# Patient Record
Sex: MALE | Race: WHITE
[De-identification: names, ages, dates, MRNs, and addresses within clinical notes are randomized per-mention and may not be internally consistent; named-entity substitution may affect disease eponyms.]

---

## 2018-05-03 ENCOUNTER — HOSPITAL ENCOUNTER (EMERGENCY)
Dept: HOSPITAL 68 - ERH | Age: 63
LOS: 1 days | End: 2018-05-04
Payer: COMMERCIAL

## 2018-05-03 DIAGNOSIS — F17.210: ICD-10-CM

## 2018-05-03 DIAGNOSIS — J02.9: Primary | ICD-10-CM

## 2018-05-03 NOTE — ED DYSPNEA/ASTHMA COMPLAINT
History of Present Illness
 
General
Chief Complaint: Dyspnea (COPD, CHF, Other)
Stated Complaint: "IM HAVING A PANIC ATTACKDIFF BREATHING"
Source: patient
Exam Limitations: no limitations
 
Vital Signs & Intake/Output
Vital Signs & Intake/Output
 Vital Signs
 
 
Date Time Temp Pulse Resp B/P B/P Pulse O2 O2 Flow FiO2
 
     Mean Ox Delivery Rate 
 
05/03 2314 97.4 77 19 148/83  97 Room Air  
 
 
 ED Intake and Output
 
 
 05/04 0000 05/03 1200
 
Intake Total  
 
Output Total  
 
Balance  
 
   
 
Patient 170 lb 
 
Weight  
 
Weight Reported by Patient 
 
Measurement  
 
Method  
 
 
 
Allergies
Coded Allergies:
No Known Allergies (05/03/18)
 
Reconcile Medications
Azithromycin (Zithromax) 500 MG TABLET   1 TAB PO DAILY BRONCHITIS
Benzonatate (Tessalon Perle) 100 MG CAPSULE   1 CAP PO TID PRN INFLAMMATION
[MAGIC MOUTH WASH] 10 ML PO TID PRN SORE THROAT
Methylprednisolone. (Medrol) 4 MG TAB.DS.PK   1 DP PO AD INFLAMMATION
     6 on day 1 then reduce by one tablet daily until gone
 
Triage Note:
PT TO ED WITH C/O SORE THROAT X 3 DAYS, REPORTS
 DIFFICULTY SWALLOWING. ABLE TO HANDLE SECRETIONS.
 SPEAKING IN FULL SENTENCES. 02 SAT 97%RA. ALSO
 REPORTS MILD SOB ON EXERTION.
Triage Nurses Notes Reviewed? yes
Onset: Gradual
Duration: constant
Timing: recent history
Severity: moderate
HPI:
Patient is a 62-year-old male with a past medical history of COPD who is 
EVERYDAY smoker compliant with Ventolin and ANORA, diabetes who presents 
emergency room with concerns of a three-day history of sore throat and 
hoarseness of voice work today he has been complaining of nonproductive cough 
and painful swallowing where he became anxious due to shortness of breath 
symptoms earlier this afternoon.  Patient states that the anxiety shortness of 
breath has improved however still complaining of sore throat.  Patient can 
tolerate by mouth denies any chest pain and arm pain jaw pain nausea vomiting 
leg swelling or hemoptysis fever or chills.
(Jeremy Glover)
 
Past History
 
Travel History
Traveled to [place] past 21 day No
 
Medical History
Any Pertinent Medical History? see below for history
Cardiovascular: hypertension
Respiratory: COPD
Endocrine: diabetes
 
Surgical History
Surgical History: non-contributory
 
Psychosocial History
What is your primary language English
Tobacco Use: Current Daily Use
Daily Tobacco Use Amount/Type: => 5 Cigarettes daily
ETOH Use: denies use
 
Family History
Hx Contributory? No
(Jeremy Glover)
 
Review of Systems
 
Review of Systems
Constitutional:
Reports: no symptoms. 
EENTM:
Reports: see HPI. 
Respiratory:
Reports: see HPI, cough, short of breath. 
Cardiovascular:
Reports: no symptoms. 
GI:
Reports: no symptoms. 
Genitourinary:
Reports: no symptoms. 
Musculoskeletal:
Reports: no symptoms. 
Skin:
Reports: no symptoms. 
Neurological/Psychological:
Reports: see HPI, anxiety. 
Hematologic/Endocrine:
Reports: no symptoms. 
Immunologic/Allergic:
Reports: no symptoms. 
All Other Systems: Reviewed and Negative
(Jeremy Glover)
 
Physical Exam
 
Physical Exam
General Appearance: no apparent distress, alert, comfortable
Head: atraumatic
Eyes:
Bilateral: normal appearance. 
Ears, Nose, Throat: hearing grossly normal
Neck: normal inspection
Respiratory: normal breath sounds, chest non-tender, no respiratory distress
Cardiovascular: regular rate/rhythm
Gastrointestinal: normal bowel sounds, soft, non-tender
Neurologic/Psych: no motor/sensory deficits
Skin: intact, normal color, warm/dry
 
Core Measures
ACS in differential dx? Yes
CVA/TIA Diagnosis No
Sepsis Present: No
Sepsis Focused Exam Completed? No
(Jeremy Glover)
 
Progress
Differential Diagnosis: asthma, AMI, bronchitis, costochondritis, CHF, COPD, 
musculoskeletal pain, pericarditis, pulmonary embolism, pneumonia, pneumothorax,
rib fracture, unstable angina
Plan of Care:
 Orders
 
 
Procedure Date/time Status
 
THROAT CULTURE W/QUICK STREP 05/04 0009 Active
 
TROPONIN LEVEL 05/04 0009 Complete
 
D-DIMER 05/04 0009 Complete
 
COMPREHENSIVE METABOLIC PANEL 05/04 0009 Complete
 
CBC WITHOUT DIFFERENTIAL 05/04 0009 Complete
 
EKG 05/04 0009 Active
 
 
 Laboratory Tests
 
 
05/04/18 0030:
Anion Gap 14, Estimated GFR > 60, BUN/Creatinine Ratio 13.8, Glucose 100  H, 
Calcium 9.5, Total Bilirubin 0.6, AST 24, ALT 31, Alkaline Phosphatase 69, 
Troponin I < 0.01, Total Protein 8.0, Albumin 4.7, Globulin 3.3, Albumin/
Globulin Ratio 1.4, D-Dimer High Sensitivty < 200, CBC w Diff NO MAN DIFF REQ, 
RBC 5.35, MCV 89.1, MCH 30.1, MCHC 33.7, RDW 15.1  H, MPV 7.3  L, Gran % 79.3  H
, Lymphocytes % 13.1  L, Monocytes % 4.9, Eosinophils % 1.9, Basophils % 0.8, 
Absolute Granulocytes 11.5  H, Absolute Lymphocytes 1.9, Absolute Monocytes 0.7 
H, Absolute Eosinophils 0.3, Absolute Basophils 0.1
Patient upon initial examination was resting comfortably at bedside (afebrile 
clear lungs auscultation oxygen saturation 96% room air.
No exudates a pharynx CENTOR criteria 0
Discuss hand off with dr smith
 
Initial ED EKG: normal intervals, normal p-waves, normal QRS complex, 65 BPM,NSR
Hand-Off
   Endorsed To:
Du Smith MD
   Endorsed Time: 0100
   Pending: labs
(Jeremy Glover)
 
Departure
 
Departure
Disposition: HOME OR SELF CARE
Condition: Stable
Clinical Impression
Primary Impression: Pharyngitis
Referrals:
Osiris Bryan MD (PCP/Family)
 
Additional Instructions:
As discussed please discontinue smoking, began a prescription for magic 
mouthwash for sore throat and Tessalon Perles for cough  Medrol Dosepak for 
inflammation and azithromycin for symptoms.  If symptoms worsen return to 
emergency room.  If no better on Monday follow-up with your doctor
Departure Forms:
Customer Survey
General Discharge Information
Prescriptions:
Current Visit Scripts
[MAGIC MOUTH WASH] 10 ML PO TID PRN SORE THROAT 
     #100 ML 
 
Methylprednisolone. (Medrol) 1 DP PO AD  
     #1 DP 
     6 on day 1 then reduce by one tablet daily until gone
 
Benzonatate (Tessalon Perle) 1 CAP PO TID PRN INFLAMMATION 
     #15 CAP 
 
Azithromycin (Zithromax) 1 TAB PO DAILY  
     #5 TAB 
 
 
(Jeremy Glover)
 
Departure
Time of Disposition: 0147
 
PA/NP Co-Sign Statement
Statement:
ED Attending supervision documentation-
 
x I saw and evaluated the patient. I have also reviewed all the pertinent lab 
results and diagnostic results. I agree with the findings and the plan of care 
as documented in the PA's/NP's documentation. 
 
[] I have reviewed the ED Record and agree with the PA's/NP's documentation.
 
[] Additions or exceptions (if any) to the PAs/NP's note and plan are 
summarized below:
[]
 
(Du Smith MD)
 
Critical Care Note
 
Critical Care Note
Critical Care Time: non-applicable
(Jeremy Glover)

## 2018-05-04 VITALS — SYSTOLIC BLOOD PRESSURE: 145 MMHG | DIASTOLIC BLOOD PRESSURE: 78 MMHG

## 2018-05-04 LAB
ABSOLUTE GRANULOCYTE CT: 11.5 /CUMM (ref 1.4–6.5)
BASOPHILS # BLD: 0.1 /CUMM (ref 0–0.2)
BASOPHILS NFR BLD: 0.8 % (ref 0–2)
EOSINOPHIL # BLD: 0.3 /CUMM (ref 0–0.7)
EOSINOPHIL NFR BLD: 1.9 % (ref 0–5)
ERYTHROCYTE [DISTWIDTH] IN BLOOD BY AUTOMATED COUNT: 15.1 % (ref 11.5–14.5)
GRANULOCYTES NFR BLD: 79.3 % (ref 42.2–75.2)
HCT VFR BLD CALC: 47.7 % (ref 42–52)
LYMPHOCYTES # BLD: 1.9 /CUMM (ref 1.2–3.4)
MCH RBC QN AUTO: 30.1 PG (ref 27–31)
MCHC RBC AUTO-ENTMCNC: 33.7 G/DL (ref 33–37)
MCV RBC AUTO: 89.1 FL (ref 80–94)
MONOCYTES # BLD: 0.7 /CUMM (ref 0.1–0.6)
PLATELET # BLD: 268 /CUMM (ref 130–400)
PMV BLD AUTO: 7.3 FL (ref 7.4–10.4)
RED BLOOD CELL CT: 5.35 /CUMM (ref 4.7–6.1)
WBC # BLD AUTO: 14.5 /CUMM (ref 4.8–10.8)

## 2018-08-28 ENCOUNTER — HOSPITAL ENCOUNTER (EMERGENCY)
Dept: HOSPITAL 68 - ERH | Age: 63
End: 2018-08-28
Payer: COMMERCIAL

## 2018-08-28 VITALS — DIASTOLIC BLOOD PRESSURE: 80 MMHG | SYSTOLIC BLOOD PRESSURE: 140 MMHG

## 2018-08-28 DIAGNOSIS — F17.210: ICD-10-CM

## 2018-08-28 DIAGNOSIS — E11.9: ICD-10-CM

## 2018-08-28 DIAGNOSIS — F41.9: ICD-10-CM

## 2018-08-28 DIAGNOSIS — F10.10: ICD-10-CM

## 2018-08-28 DIAGNOSIS — J44.1: Primary | ICD-10-CM

## 2018-08-28 DIAGNOSIS — I10: ICD-10-CM

## 2018-08-28 LAB
ABSOLUTE GRANULOCYTE CT: 11 /CUMM (ref 1.4–6.5)
BASOPHILS # BLD: 0 /CUMM (ref 0–0.2)
BASOPHILS NFR BLD: 0.1 % (ref 0–2)
EOSINOPHIL # BLD: 0.4 /CUMM (ref 0–0.7)
EOSINOPHIL NFR BLD: 2.8 % (ref 0–5)
ERYTHROCYTE [DISTWIDTH] IN BLOOD BY AUTOMATED COUNT: 14.7 % (ref 11.5–14.5)
GRANULOCYTES NFR BLD: 86 % (ref 42.2–75.2)
HCT VFR BLD CALC: 47.3 % (ref 42–52)
LYMPHOCYTES # BLD: 0.9 /CUMM (ref 1.2–3.4)
MCH RBC QN AUTO: 30.1 PG (ref 27–31)
MCHC RBC AUTO-ENTMCNC: 33.9 G/DL (ref 33–37)
MCV RBC AUTO: 88.8 FL (ref 80–94)
MONOCYTES # BLD: 0.5 /CUMM (ref 0.1–0.6)
PLATELET # BLD: 243 /CUMM (ref 130–400)
PMV BLD AUTO: 7.2 FL (ref 7.4–10.4)
RED BLOOD CELL CT: 5.33 /CUMM (ref 4.7–6.1)
WBC # BLD AUTO: 12.8 /CUMM (ref 4.8–10.8)

## 2018-08-28 NOTE — RADIOLOGY REPORT
EXAMINATION:
XR CHEST
 
CLINICAL INFORMATION:
Shortness of breath
 
COMPARISON:
None
 
TECHNIQUE:
2 views of the chest were obtained.
 
FINDINGS:
The lungs appear somewhat hyperinflated, suggesting underlying COPD. No focal
consolidation. No evidence of pneumothorax, pulmonary edema, or pleural
effusions.
 
The cardiomediastinal silhouette is unremarkable. No acute osseous findings.
 
IMPRESSION:
No acute cardiopulmonary findings.

## 2018-08-28 NOTE — ED DYSPNEA/ASTHMA COMPLAINT
History of Present Illness
 
General
Chief Complaint: Dyspnea (COPD, CHF, Other)
Stated Complaint: "I CAN'T BREATH, PANCIK ATTACK"HR 75 AND SPO2 96%
Source: patient, old records
Exam Limitations: no limitations
 
Vital Signs & Intake/Output
Vital Signs & Intake/Output
 Vital Signs
 
 
Date Time Temp Pulse Resp B/P B/P Pulse O2 O2 Flow FiO2
 
     Mean Ox Delivery Rate 
 
08/28 0513 97.7 64 20 145/78  94 Room Air  
 
 
 
Allergies
Coded Allergies:
No Known Allergies (05/03/18)
 
Reconcile Medications
Azithromycin (Zithromax) 250 MG TABLET   1 DP PO AD pharyngitis
     Start tomorrow
Benzonatate (Tessalon Perle) 100 MG CAPSULE   1 CAP PO TID PRN cough
Hydroxyzine Hydrochloride (Atarax) 25 MG TAB   1-2 TAB PO Q6P PRN anxiety
Ibuprofen 600 MG TABLET   1 TAB PO Q6P PRN pain
     with food
[Magic mouthwash] 5-10 ML PO Q6P PRN pain
     1:1:1=viscous lidocaine, maalox, benadryl
Methylprednisolone. (Medrol) 4 MG TAB.DS.PK   0 PO SEE ADMIN CRITERIA 
pharyngitis
 
Triage Note:
TRIAGE: PATIENT TO ER FROM HOME REPORTING HX COPD,
 HERE TONIGHT FOR COPD VS. "PANIC ATTACK." PATIENT
 REPORTING +SOB AT THIS TIME, "SCARES THE SH*T OUT
 OF ME." SPEECH CLEAR. MD REYES EVALUATING
 PATIENT IN TRIAGE.
Triage Nurses Notes Reviewed? yes
HPI:
Patient presents feeling like he can't breathe.  Patient has emphysema and he 
continues to smoke.  Patient states that he also suffers from panic attacks.  
Patient states that over the past 2 days he has been having worsening symptoms 
dyspnea on exertion and orthopnea.  He states that he lays down and just feels 
like he cannot breathe so he sits up.  Patient states that he has not slept in 
the past 2 days because of these symptoms.  He denies any leg swelling or 
anorexia.  There are no fevers or chills.  There is no coughing.  There is no 
chest pain.  There are no palpitations.
 
Past History
 
Travel History
Traveled to [place] past 21 day No
 
Medical History
Any Pertinent Medical History? see below for history
Neurological: NONE
EENT: NONE
Cardiovascular: hypertension
Respiratory: COPD
Gastrointestinal: NONE
Hepatic: NONE
Renal: NONE
Musculoskeletal: NONE
Psychiatric: anxiety
Endocrine: diabetes
Blood Disorders: NONE
Cancer(s): NONE
GYN/Reproductive: NONE
 
Surgical History
Surgical History: non-contributory
 
Psychosocial History
What is your primary language English
Tobacco Use: Current Daily Use
Daily Tobacco Use Amount/Type: => 5 Cigarettes daily
ETOH Use: occasional use
Illicit Drug Use: denies illicit drug use
 
Family History
Hx Contributory? No
 
Review of Systems
 
Review of Systems
Constitutional:
Reports: no symptoms. 
EENTM:
Reports: no symptoms. 
Respiratory:
Reports: see HPI, cough, orthopnea, short of breath. 
Cardiovascular:
Reports: no symptoms. 
GI:
Reports: no symptoms. 
Genitourinary:
Reports: no symptoms. 
Musculoskeletal:
Reports: no symptoms. 
Skin:
Reports: no symptoms. 
Neurological/Psychological:
Reports: no symptoms. 
Hematologic/Endocrine:
Reports: no symptoms. 
Immunologic/Allergic:
Reports: no symptoms. 
All Other Systems: Reviewed and Negative
 
Physical Exam
 
Physical Exam
General Appearance: well developed/nourished, alert, awake, anxious, moderate 
distress
Head: atraumatic, normal appearance
Eyes:
Bilateral: PERRL, EOMI. 
Ears, Nose, Throat: normal pharynx, normal ENT inspection, hearing grossly 
normal
Neck: normal inspection, supple, full range of motion, NO jvd
Respiratory: decreased breath sounds, wheezing, respiratory distress
Cardiovascular: regular rate/rhythm, normal peripheral pulses
Gastrointestinal: normal bowel sounds, soft, non-tender, no organomegaly
Extremities: normal inspection, normal capillary refill, normal range of motion,
no edema
Neurologic/Psych: no motor/sensory deficits, awake, alert, oriented x 3, normal 
gait, normal mood/affect
Skin: intact, normal color, warm/dry
Lymphatic: no anterior cervical calixto
 
Core Measures
ACS in differential dx? No
CVA/TIA Diagnosis No
Sepsis Present: No
Sepsis Focused Exam Completed? No
 
Progress
Differential Diagnosis: AMI, bronchitis, CHF, COPD, pulmonary embolism, 
pneumonia
Plan of Care:
 Orders
 
 
Procedure Date/time Status
 
ARTERIAL BLOOD GAS (GEN) 08/28 0553 Complete
 
Telemetry/Cardiac Monitor 08/28 0548 Active
 
TROPONIN LEVEL 08/28 0548 Complete
 
COMPREHENSIVE METABOLIC PANEL 08/28 0548 Complete
 
CBC WITHOUT DIFFERENTIAL 08/28 0548 Complete
 
B-TYPE NATRIURETIC PEP (BNP) 08/28 0548 Complete
 
EKG 08/28 0548 Active
 
 
 Laboratory Tests
 
 
08/28/18 0602:
Anion Gap 10, Estimated GFR > 60, BUN/Creatinine Ratio 16.3, Glucose 112  H, 
Calcium 9.9, Total Bilirubin 0.7, AST 33, ALT 33, Alkaline Phosphatase 52, 
Troponin I < 0.01, Pro-B-Natriuretic Pept 88.7, Total Protein 7.8, Albumin 4.8, 
Globulin 3.0, Albumin/Globulin Ratio 1.6, CBC w Diff MAN DIFF ORDERED, RBC 5.33,
MCV 88.8, MCH 30.1, MCHC 33.9, RDW 14.7  H, MPV 7.2  L, Gran % 86.0  H, 
Lymphocytes % 7.2  L, Monocytes % 3.9, Eosinophils % 2.8, Basophils % 0.1, 
Absolute Granulocytes 11.0  H, Segmented Neutrophils 83  H, Absolute Lymphocytes
0.9  L, Lymphocytes 8  L, Monocytes 7, Absolute Monocytes 0.5, Eosinophils 2, 
Absolute Eosinophils 0.4, Absolute Basophils 0, Platelet Estimate ADEQUATE, 
Polychromasia 1+, Ovalocytes FEW, Stomatocytes FEW, Fld Total RBCs Counted 100
 
08/28/18 0555:
pH 7.45, pCO2 35, pO2 61  L, HCO3 24, ABG O2 Sat (Measured) 92.0  L, P-50 (Temp 
Corrected) N, Carboxyhemoglobin 6.5 *H, O2 Concentration % RA, Phlebotomy Draw 
Site RIGHT BRACHIAL
 
Diagnostic Imaging:
Viewed by Me: Radiology Read.  Discussed w/RAD: Radiology Read. 
Initial ED EKG: NSR, nonspecific ST T wave chg
Prior EKG: unchanged
Comments:
Patient is feeling much better but still feels very anxious.  Patient states he 
does suffer from anxiety but has not talked to his doctor about it.  Patient 
denies any chest pain.
 
Departure
 
Departure
Disposition: HOME OR SELF CARE
Condition: Stable
Clinical Impression
Primary Impression: COPD exacerbation
Secondary Impressions: Anxiety
Referrals:
Irvin HENDERSON,Osiris CHAPMAN (PCP/Family)
 
Additional Instructions:
Take Ativan as needed.  Return if symptoms worsen or for any concerns.
Departure Forms:
Customer Survey
General Discharge Information
Prescriptions:
Current Visit Scripts
Lorazepam (Ativan) 1 TAB PO BIDP PRN anxiety 
     #20 TAB 
 
 
 
Critical Care Note
 
Critical Care Note
Critical Care Time: non-applicable